# Patient Record
Sex: FEMALE | URBAN - METROPOLITAN AREA
[De-identification: names, ages, dates, MRNs, and addresses within clinical notes are randomized per-mention and may not be internally consistent; named-entity substitution may affect disease eponyms.]

---

## 2024-10-25 ENCOUNTER — APPOINTMENT (RX ONLY)
Dept: URBAN - METROPOLITAN AREA OTHER 1 | Facility: OTHER | Age: 19
Setting detail: DERMATOLOGY
End: 2024-10-25

## 2024-10-25 DIAGNOSIS — Z79.899 OTHER LONG TERM (CURRENT) DRUG THERAPY: ICD-10-CM

## 2024-10-25 DIAGNOSIS — L20.89 OTHER ATOPIC DERMATITIS: ICD-10-CM | Status: INADEQUATELY CONTROLLED

## 2024-10-25 PROCEDURE — 99204 OFFICE O/P NEW MOD 45 MIN: CPT

## 2024-10-25 PROCEDURE — ? COUNSELING

## 2024-10-25 PROCEDURE — ? PRESCRIPTION

## 2024-10-25 PROCEDURE — ? DUPIXENT INITIATION

## 2024-10-25 PROCEDURE — ? HIGH RISK MEDICATION MONITORING

## 2024-10-25 PROCEDURE — ? PRESCRIPTION MEDICATION MANAGEMENT

## 2024-10-25 RX ORDER — TRIAMCINOLONE ACETONIDE 1 MG/G
CREAM TOPICAL BID
Qty: 453.6 | Refills: 2 | Status: ERX | COMMUNITY
Start: 2024-10-25

## 2024-10-25 RX ORDER — DUPILUMAB 300 MG/2ML
INJECTION, SOLUTION SUBCUTANEOUS
Qty: 2 | Refills: 3 | Status: ERX | COMMUNITY
Start: 2024-10-25

## 2024-10-25 RX ADMIN — TRIAMCINOLONE ACETONIDE: 1 CREAM TOPICAL at 00:00

## 2024-10-25 RX ADMIN — DUPILUMAB: 300 INJECTION, SOLUTION SUBCUTANEOUS at 00:00

## 2024-10-25 ASSESSMENT — SEVERITY ASSESSMENT 2020: SEVERITY 2020: MODERATE

## 2024-10-25 ASSESSMENT — LOCATION SIMPLE DESCRIPTION DERM
LOCATION SIMPLE: LOWER BACK
LOCATION SIMPLE: ABDOMEN
LOCATION SIMPLE: RIGHT FOREARM
LOCATION SIMPLE: LEFT THIGH
LOCATION SIMPLE: LEFT FOREARM
LOCATION SIMPLE: RIGHT THIGH

## 2024-10-25 ASSESSMENT — LOCATION ZONE DERM
LOCATION ZONE: ARM
LOCATION ZONE: LEG
LOCATION ZONE: TRUNK

## 2024-10-25 ASSESSMENT — LOCATION DETAILED DESCRIPTION DERM
LOCATION DETAILED: EPIGASTRIC SKIN
LOCATION DETAILED: SUPERIOR LUMBAR SPINE
LOCATION DETAILED: LEFT VENTRAL PROXIMAL FOREARM
LOCATION DETAILED: RIGHT ANTERIOR PROXIMAL THIGH
LOCATION DETAILED: RIGHT VENTRAL DISTAL FOREARM
LOCATION DETAILED: LEFT ANTERIOR PROXIMAL THIGH

## 2024-12-06 ENCOUNTER — APPOINTMENT (OUTPATIENT)
Dept: URBAN - METROPOLITAN AREA OTHER 1 | Facility: OTHER | Age: 19
Setting detail: DERMATOLOGY
End: 2024-12-06

## 2024-12-06 DIAGNOSIS — Z79.899 OTHER LONG TERM (CURRENT) DRUG THERAPY: ICD-10-CM

## 2024-12-06 DIAGNOSIS — L20.89 OTHER ATOPIC DERMATITIS: ICD-10-CM | Status: INADEQUATELY CONTROLLED

## 2024-12-06 PROCEDURE — ? COUNSELING

## 2024-12-06 PROCEDURE — ? DUPIXENT INITIATION

## 2024-12-06 PROCEDURE — ? PRESCRIPTION

## 2024-12-06 PROCEDURE — ? HIGH RISK MEDICATION MONITORING

## 2024-12-06 PROCEDURE — ? PRESCRIPTION MEDICATION MANAGEMENT

## 2024-12-06 PROCEDURE — ? EASI CALCULATION

## 2024-12-06 PROCEDURE — ? ADDITIONAL NOTES

## 2024-12-06 PROCEDURE — 99214 OFFICE O/P EST MOD 30 MIN: CPT

## 2024-12-06 RX ORDER — DUPILUMAB 300 MG/2ML
INJECTION, SOLUTION SUBCUTANEOUS
Qty: 2 | Refills: 3 | Status: CANCELLED
Stop reason: SDUPTHER

## 2024-12-06 RX ORDER — RUXOLITINIB 15 MG/G
CREAM TOPICAL
Qty: 60 | Refills: 8 | Status: ERX | COMMUNITY
Start: 2024-12-06

## 2024-12-06 RX ADMIN — RUXOLITINIB: 15 CREAM TOPICAL at 00:00

## 2024-12-06 ASSESSMENT — LOCATION SIMPLE DESCRIPTION DERM
LOCATION SIMPLE: LOWER BACK
LOCATION SIMPLE: RIGHT FOREARM
LOCATION SIMPLE: ABDOMEN
LOCATION SIMPLE: LEFT FOREARM

## 2024-12-06 ASSESSMENT — SEVERITY ASSESSMENT 2020: SEVERITY 2020: SEVERE

## 2024-12-06 ASSESSMENT — BSA ECZEMA: % BODY COVERED IN ECZEMA: 50

## 2024-12-06 ASSESSMENT — LOCATION ZONE DERM
LOCATION ZONE: ARM
LOCATION ZONE: TRUNK

## 2024-12-06 ASSESSMENT — LOCATION DETAILED DESCRIPTION DERM
LOCATION DETAILED: RIGHT VENTRAL DISTAL FOREARM
LOCATION DETAILED: EPIGASTRIC SKIN
LOCATION DETAILED: SUPERIOR LUMBAR SPINE
LOCATION DETAILED: LEFT VENTRAL PROXIMAL FOREARM

## 2024-12-06 ASSESSMENT — ITCH NUMERIC RATING SCALE: HOW SEVERE IS YOUR ITCHING?: 8

## 2024-12-06 NOTE — PROCEDURE: PRESCRIPTION MEDICATION MANAGEMENT
Samples Given: Dupixent 2 syringes
Render In Strict Bullet Format?: No
Initiate Treatment: Dupixent 600mg\\n600 mg SC day 0 then 300 mg SC every other week\\n\\n\\nOpzelura 1.5 % topical cream \\nSig: Apply to eczema areas on body once daily.
Detail Level: Zone
Discontinue Regimen: Triamcinolone 0.1% cream\\nApply to rash area of the body BID x 2 weeks, 1 week off, repeat PRN flares

## 2024-12-06 NOTE — PROCEDURE: ADDITIONAL NOTES
Render Risk Assessment In Note?: no
Additional Notes: Pt still waiting on dupixent approval. It has been 2 months. She is getting new lesions on her BL LEs. She has recalcitrant lesions on BL UEs and trunk. Today, plan to trial opzelura cream to bridge between now and getting dupixent covered. Will work with insurance and specialty pharmacy to expediate PA approval. RTC in 2 months. 
Detail Level: Simple

## 2024-12-06 NOTE — PROCEDURE: EASI CALCULATION
Body Surface % (Lower Limbs To Buttocks): 10-29 %
Excoriation (Upper Limbs): Severe
Age Of Patient: Greater Than 7 Years Old
Excoriation (Trunk To Groin): Moderate
Induration/Papulation (Head): None
Lichenification (Lower Limbs To Buttocks): Mild
Detail Level: Simple
Show Score For Individual Areas?: Yes

## 2025-01-08 ENCOUNTER — RX ONLY (RX ONLY)
Age: 20
End: 2025-01-08

## 2025-01-08 RX ORDER — DUPILUMAB 300 MG/2ML
INJECTION, SOLUTION SUBCUTANEOUS
Qty: 4 | Refills: 0 | Status: ERX

## 2025-01-08 RX ORDER — DUPILUMAB 300 MG/2ML
INJECTION, SOLUTION SUBCUTANEOUS
Qty: 4 | Refills: 3 | Status: ERX